# Patient Record
Sex: FEMALE | Race: WHITE | HISPANIC OR LATINO | Employment: PART TIME | URBAN - METROPOLITAN AREA
[De-identification: names, ages, dates, MRNs, and addresses within clinical notes are randomized per-mention and may not be internally consistent; named-entity substitution may affect disease eponyms.]

---

## 2017-02-08 ENCOUNTER — RECORDS - HEALTHEAST (OUTPATIENT)
Dept: LAB | Facility: CLINIC | Age: 42
End: 2017-02-08

## 2017-02-08 LAB
CHOLEST SERPL-MCNC: 147 MG/DL
FASTING STATUS PATIENT QL REPORTED: NORMAL
HDLC SERPL-MCNC: 56 MG/DL
LDLC SERPL CALC-MCNC: 82 MG/DL
TRIGL SERPL-MCNC: 46 MG/DL

## 2017-02-09 LAB — HBA1C MFR BLD: 6.7 % (ref 4.2–6.1)

## 2017-10-02 ENCOUNTER — RECORDS - HEALTHEAST (OUTPATIENT)
Dept: LAB | Facility: CLINIC | Age: 42
End: 2017-10-02

## 2017-10-03 LAB — HBA1C MFR BLD: 6.6 % (ref 4.2–6.1)

## 2017-12-24 ENCOUNTER — HEALTH MAINTENANCE LETTER (OUTPATIENT)
Age: 42
End: 2017-12-24

## 2018-08-20 ENCOUNTER — HOSPITAL ENCOUNTER (EMERGENCY)
Facility: CLINIC | Age: 43
Discharge: HOME | End: 2018-08-20
Attending: EMERGENCY MEDICINE | Admitting: EMERGENCY MEDICINE

## 2018-08-20 VITALS
HEART RATE: 82 BPM | SYSTOLIC BLOOD PRESSURE: 131 MMHG | HEIGHT: 62 IN | BODY MASS INDEX: 26.68 KG/M2 | OXYGEN SATURATION: 98 % | RESPIRATION RATE: 16 BRPM | DIASTOLIC BLOOD PRESSURE: 72 MMHG | WEIGHT: 145 LBS | TEMPERATURE: 98.1 F

## 2018-08-20 DIAGNOSIS — E16.2 HYPOGLYCEMIA: ICD-10-CM

## 2018-08-20 LAB
ANION GAP SERPL CALCULATED.3IONS-SCNC: 4 MMOL/L (ref 3–14)
BUN SERPL-MCNC: 15 MG/DL (ref 7–30)
CALCIUM SERPL-MCNC: 8.8 MG/DL (ref 8.5–10.1)
CHLORIDE SERPL-SCNC: 110 MMOL/L (ref 94–109)
CO2 SERPL-SCNC: 29 MMOL/L (ref 20–32)
CREAT SERPL-MCNC: 0.59 MG/DL (ref 0.52–1.04)
GFR SERPL CREATININE-BSD FRML MDRD: >90 ML/MIN/1.7M2
GLUCOSE BLDC GLUCOMTR-MCNC: 117 MG/DL (ref 70–99)
GLUCOSE BLDC GLUCOMTR-MCNC: 209 MG/DL (ref 70–99)
GLUCOSE SERPL-MCNC: 38 MG/DL (ref 70–99)
POTASSIUM SERPL-SCNC: 3.4 MMOL/L (ref 3.4–5.3)
SODIUM SERPL-SCNC: 143 MMOL/L (ref 133–144)

## 2018-08-20 PROCEDURE — 00000146 ZZHCL STATISTIC GLUCOSE BY METER IP

## 2018-08-20 PROCEDURE — 99284 EMERGENCY DEPT VISIT MOD MDM: CPT | Mod: 25

## 2018-08-20 PROCEDURE — 80048 BASIC METABOLIC PNL TOTAL CA: CPT | Performed by: EMERGENCY MEDICINE

## 2018-08-20 PROCEDURE — 96374 THER/PROPH/DIAG INJ IV PUSH: CPT

## 2018-08-20 PROCEDURE — 25800025 ZZH RX 258: Performed by: EMERGENCY MEDICINE

## 2018-08-20 RX ORDER — DEXTROSE MONOHYDRATE 25 G/50ML
25 INJECTION, SOLUTION INTRAVENOUS ONCE
Status: COMPLETED | OUTPATIENT
Start: 2018-08-20 | End: 2018-08-20

## 2018-08-20 RX ADMIN — DEXTROSE MONOHYDRATE 25 ML: 25 INJECTION, SOLUTION INTRAVENOUS at 14:54

## 2018-08-20 ASSESSMENT — ENCOUNTER SYMPTOMS
CONFUSION: 1
FEVER: 0
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
## 2018-08-20 NOTE — ED AVS SNAPSHOT
Emergency Department    [address] [facility] [address]    Phone:  [phone]    Fax:  [phone]                                       Keyla Piña   MRN: [id number]    Department:   Emergency Department   Date of Visit:  8/20/2018           Patient Information     Date Of Birth          1975        Your diagnoses for this visit were:     Hypoglycemia        You were seen by Dorene Mccray MD.      Follow-up Information     Follow up with Clinic, [facility].    Contact information:    [address] [facility] [address] [phone]          Follow up with  Emergency Department.    Specialty:  EMERGENCY MEDICINE    Why:  If symptoms worsen    Contact information:    [address] [facility] [address] [phone]        Discharge Instructions         Hypoglycemia (Low Blood Sugar)     Fast-acting sugar includes a cup of nonfat milk.   Too little sugar (glucose) in your blood is called hypoglycemia or low blood sugar. Low blood sugar usually means anything lower than 70 mg/dL. Talk with your healthcare provider about your target range and what level is too low for you. Diabetes itself doesn t cause low blood sugar. But some of the treatments for diabetes, such as pills or insulin, may raise your risk for it. Low blood sugar may cause you to pass out or have a seizure. So always treat low blood sugar right away, but don't overeat.  Special note: Always carry a source of fast-acting sugar and a snack in case of hypoglycemia.   What you may notice  If you have low blood sugar, you may have one or more of these symptoms:    Shakiness or dizziness    Cold, clammy skin or sweating    Feelings of hunger    Headache    Nervousness    A hard, fast heartbeat    Weakness    Confusion or irritability    Blurred vision    Having nightmares or waking up confused or sweating    Numbness or tingling in the lips or tongue  What you should do  Here are tips to  follow if you have hypoglycemia:     First check your blood sugar. If it is too low (out of your target range), eat or drink 15 to 20 grams of fast-acting sugar. This may be 3 to 4 glucose tablets, 4 ounces (half a cup) of fruit juice or regular (nondiet) soda, 8 ounces (1 cup) of fat-free milk, or 1 tablespoon of honey. Don t take more than this, or your blood sugar may go too high.    Wait 15 minutes. Then recheck your blood sugar if you can.    If your blood sugar is still too low, repeat the steps above and check your blood sugar again. If your blood sugar still has not returned to your target range, contact your healthcare provider or seek emergency care.    Once your blood sugar returns to target range, eat a snack or meal.  Preventing low blood sugar  Things you can do include the following:     If your condition needs a strict treatment plan, eat your meals and snacks at the same times each day. Don t skip meals!    If your treatment plan lets you change when you eat and what you eat, learn how to change the time and dose of your rapid-acting insulin to match this.     Ask your healthcare provider if it is safe for you to drink alcohol. Never drink on an empty stomach.    Take your medicine at the prescribed times.    Always carry a source of fast-acting sugar and a snack when you re away from home.  Other things to do  Additional tips include the following:    Carry a medical ID card, a compact USB drive, or wear a medical alert bracelet or necklace. It should say that you have diabetes. It should also say what to do if you pass out or have a seizure.    Make sure your family, friends, and coworkers know the signs of low blood sugar. Tell them what to do if your blood sugar falls very low and you can t treat yourself.    Keep a glucagon emergency kit handy. Be sure your family, friends, and coworkers know how and when to use it. Check it regularly and replace the glucagon before it expires.    Talk with  your health care team about other things you can do to prevent low blood sugar.     If you have unexplained hypoglycemia or hypoglycemia several times, call your healthcare provider.   Date Last Reviewed: 5/1/2016 2000-2017 [employer]. [address] [place] [address], [place] [address]. All rights reserved. This information is not intended as a substitute for professional medical care. Always follow your healthcare professional's instructions.          24 Hour Appointment Hotline       To make an appointment at any [facility], call [phone] ([phone]). If you don't have a family doctor or clinic, we will help you find one. [facility] clinics are conveniently located to serve the needs of you and your family.             Review of your medicines      Our records show that you are taking the medicines listed below. If these are incorrect, please call your family doctor or clinic.        Dose / Directions Last dose taken    * insulin aspart 100 UNIT/ML injection   Commonly known as:  NovoLOG PEN   Quantity:  1 Month        Pre-meal blood glucose (BG)  - 164 give 1 unit.  - 189 give 2 units.  - 214 give 3 units.  - 239 give 4 units.  - 264 give 5 units.  - 289 give 6 units.  - 314 give 7 units.  - 339 give 8 units.  - 364 give 9 units. BG over 364 give 10 units.   Refills:  0        * insulin aspart 100 UNITS/ML injection   Commonly known as:  NovoLOG VIAL   Quantity:  3 Month        5 units before breakfast, 5 units before lunch, 5 units before dinner PLUS Sliding scale   Refills:  3        insulin glargine 100 UNIT/ML injection   Commonly known as:  LANTUS SOLOSTAR   Quantity:  1 Month        25 units at bedtime   Refills:  1        * Notice:  This list has 2 medication(s) that are the same as other medications prescribed for you. Read the directions carefully, and ask your doctor or other care provider to review them with you.             Procedures and tests performed during your visit     Procedure/Test Number of Times Performed    Basic metabolic panel 1    Glucose by meter 2    Glucose monitor nursing POCT 2      Orders Needing Specimen Collection     None      Pending Results     No orders found from 8/18/2018 to 8/21/2018.            Pending Culture Results     No orders found from 8/18/2018 to 8/21/2018.            Pending Results Instructions     If you had any lab results that were not finalized at the time of your Discharge, you can call the ED Lab Result RN at [phone]. You will be contacted by this team for any positive Lab results or changes in treatment. The nurses are available 7 days a week from 10A to 6:30P.  You can leave a message 24 hours per day and they will return your call.        Test Results From Your Hospital Stay        8/20/2018  2:46 PM      Component Results     Component Value Ref Range & Units Status    Sodium 143 133 - 144 mmol/L Final    Potassium 3.4 3.4 - 5.3 mmol/L Final    Chloride 110 (H) 94 - 109 mmol/L Final    Carbon Dioxide 29 20 - 32 mmol/L Final    Anion Gap 4 3 - 14 mmol/L Final    Glucose 38 (LL) 70 - 99 mg/dL Final    Critical Value called to and read back by  ANA GODINEZ IN ER AT 1442 SM      Urea Nitrogen 15 7 - 30 mg/dL Final    Creatinine 0.59 0.52 - 1.04 mg/dL Final    GFR Estimate >90 >60 mL/min/1.7m2 Final    Non  GFR Calc    GFR Estimate If Black >90 >60 mL/min/1.7m2 Final    African American GFR Calc    Calcium 8.8 8.5 - 10.1 mg/dL Final         8/20/2018  4:22 PM      Component Results     Component Value Ref Range & Units Status    Glucose 117 (H) 70 - 99 mg/dL Final         8/20/2018  5:34 PM      Component Results     Component Value Ref Range & Units Status    Glucose 209 (H) 70 - 99 mg/dL Final                Clinical Quality Measure: Blood Pressure Screening     Your blood pressure was checked while you were in the emergency department today. The last reading we  "obtained was  BP: 131/72 . Please read the guidelines below about what these numbers mean and what you should do about them.  If your systolic blood pressure (the top number) is less than 120 and your diastolic blood pressure (the bottom number) is less than 80, then your blood pressure is normal. There is nothing more that you need to do about it.  If your systolic blood pressure (the top number) is 120-139 or your diastolic blood pressure (the bottom number) is 80-89, your blood pressure may be higher than it should be. You should have your blood pressure rechecked within a year by a primary care provider.  If your systolic blood pressure (the top number) is 140 or greater or your diastolic blood pressure (the bottom number) is 90 or greater, you may have high blood pressure. High blood pressure is treatable, but if left untreated over time it can put you at risk for heart attack, stroke, or kidney failure. You should have your blood pressure rechecked by a primary care provider within the next 4 weeks.  If your provider in the emergency department today gave you specific instructions to follow-up with your doctor or provider even sooner than that, you should follow that instruction and not wait for up to 4 weeks for your follow-up visit.        Thank you for choosing [facility]       Thank you for choosing [facility] for your care. Our goal is always to provide you with excellent care. Hearing back from our patients is one way we can continue to improve our services. Please take a few minutes to complete the written survey that you may receive in the mail after you visit with us. Thank you!        [employer] Information     [employer] lets you send messages to your doctor, view your test results, renew your prescriptions, schedule appointments and more. To sign up, go to [email or web address] . Click on \"Log in\" on the left side of the screen, which will take you to the Welcome page. Then click on \"Sign up Now\" on the " right side of the page.     You will be asked to enter the access code listed below, as well as some personal information. Please follow the directions to create your username and password.     Your access code is: [email or web address]  Expires: 2018  6:02 PM     Your access code will  in 90 days. If you need help or a new code, please call your [facility] clinic or [phone].        Care EveryWhere ID     This is your Care EveryWhere ID. This could be used by other organizations to access your [facility] medical records  [id number]        Equal Access to Services     [facility] : Fidelia Salamanca, pedro govea, tabitha galindo, vanessa fajardo . So [facility] [phone].    ATENCIÓN: Si habla español, tiene a charles disposición servicios gratuitos de asistencia lingüística. Llame al [phone].    We comply with applicable federal civil rights laws and [place] laws. We do not discriminate on the basis of race, color, national origin, age, disability, sex, sexual orientation, or gender identity.            After Visit Summary       This is your record. Keep this with you and show to your community pharmacist(s) and doctor(s) at your next visit.

## 2018-08-20 NOTE — DISCHARGE INSTRUCTIONS
Hypoglycemia (Low Blood Sugar)     Fast-acting sugar includes a cup of nonfat milk.   Too little sugar (glucose) in your blood is called hypoglycemia or low blood sugar. Low blood sugar usually means anything lower than 70 mg/dL. Talk with your healthcare provider about your target range and what level is too low for you. Diabetes itself doesn t cause low blood sugar. But some of the treatments for diabetes, such as pills or insulin, may raise your risk for it. Low blood sugar may cause you to pass out or have a seizure. So always treat low blood sugar right away, but don't overeat.  Special note: Always carry a source of fast-acting sugar and a snack in case of hypoglycemia.   What you may notice  If you have low blood sugar, you may have one or more of these symptoms:    Shakiness or dizziness    Cold, clammy skin or sweating    Feelings of hunger    Headache    Nervousness    A hard, fast heartbeat    Weakness    Confusion or irritability    Blurred vision    Having nightmares or waking up confused or sweating    Numbness or tingling in the lips or tongue  What you should do  Here are tips to follow if you have hypoglycemia:     First check your blood sugar. If it is too low (out of your target range), eat or drink 15 to 20 grams of fast-acting sugar. This may be 3 to 4 glucose tablets, 4 ounces (half a cup) of fruit juice or regular (nondiet) soda, 8 ounces (1 cup) of fat-free milk, or 1 tablespoon of honey. Don t take more than this, or your blood sugar may go too high.    Wait 15 minutes. Then recheck your blood sugar if you can.    If your blood sugar is still too low, repeat the steps above and check your blood sugar again. If your blood sugar still has not returned to your target range, contact your healthcare provider or seek emergency care.    Once your blood sugar returns to target range, eat a snack or meal.  Preventing low blood sugar  Things you can do include the following:     If your condition  needs a strict treatment plan, eat your meals and snacks at the same times each day. Don t skip meals!    If your treatment plan lets you change when you eat and what you eat, learn how to change the time and dose of your rapid-acting insulin to match this.     Ask your healthcare provider if it is safe for you to drink alcohol. Never drink on an empty stomach.    Take your medicine at the prescribed times.    Always carry a source of fast-acting sugar and a snack when you re away from home.  Other things to do  Additional tips include the following:    Carry a medical ID card, a compact USB drive, or wear a medical alert bracelet or necklace. It should say that you have diabetes. It should also say what to do if you pass out or have a seizure.    Make sure your family, friends, and coworkers know the signs of low blood sugar. Tell them what to do if your blood sugar falls very low and you can t treat yourself.    Keep a glucagon emergency kit handy. Be sure your family, friends, and coworkers know how and when to use it. Check it regularly and replace the glucagon before it expires.    Talk with your health care team about other things you can do to prevent low blood sugar.     If you have unexplained hypoglycemia or hypoglycemia several times, call your healthcare provider.   Date Last Reviewed: 5/1/2016 2000-2017 [employer]. [address] [place] [address], [place] [address]. All rights reserved. This information is not intended as a substitute for professional medical care. Always follow your healthcare professional's instructions.

## 2018-08-20 NOTE — ED AVS SNAPSHOT
Emergency Department    [address] [place] [address]    Phone:  [phone]    Fax:  [phone]                                       Keyla Piña   MRN: [id number]    Department:   Emergency Department   Date of Visit:  8/20/2018           After Visit Summary Signature Page     I have received my discharge instructions, and my questions have been answered. I have discussed any challenges I see with this plan with the nurse or doctor.    ..........................................................................................................................................  Patient/Patient Representative Signature      ..........................................................................................................................................  Patient Representative Print Name and Relationship to Patient    ..................................................               ................................................  Date                                            Time    ..........................................................................................................................................  Reviewed by Signature/Title    ...................................................              ..............................................  Date                                                            Time

## 2018-08-20 NOTE — ED NOTES
DATE:  8/20/2018   TIME OF RECEIPT FROM LAB:  1442  LAB TEST:  glucose  LAB VALUE:  38  RESULTS GIVEN WITH READ-BACK TO (PROVIDER): [place]  TIME LAB VALUE REPORTED TO PROVIDER:   1447

## 2018-08-20 NOTE — ED NOTES
Bed: ED25  Expected date:   Expected time:   Means of arrival:   Comments:  Xi - 43F diabetic eta 1400
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
## 2018-08-22 ENCOUNTER — RECORDS - HEALTHEAST (OUTPATIENT)
Dept: LAB | Facility: CLINIC | Age: 43
End: 2018-08-22

## 2018-08-23 LAB — HBA1C MFR BLD: 5.4 % (ref 4.2–6.1)

## 2019-12-02 ENCOUNTER — HOSPITAL ENCOUNTER (OUTPATIENT)
Dept: LAB | Age: 44
Setting detail: SPECIMEN
Discharge: HOME | End: 2019-12-02

## 2019-12-04 LAB
HPV SOURCE: NORMAL
HUMAN PAPILLOMA VIRUS 16 DNA: NEGATIVE
HUMAN PAPILLOMA VIRUS 18 DNA: NEGATIVE
HUMAN PAPILLOMA VIRUS FINAL DIAGNOSIS: NORMAL
HUMAN PAPILLOMA VIRUS OTHER HR: NEGATIVE
SPECIMEN DESCRIPTION: NORMAL

## 2019-12-09 ENCOUNTER — RECORDS - HEALTHEAST (OUTPATIENT)
Dept: ADMINISTRATIVE | Facility: OTHER | Age: 44
End: 2019-12-09

## 2019-12-09 LAB
BKR LAB AP ABNORMAL BLEEDING: NO
BKR LAB AP BIRTH CONTROL/HORMONES: NORMAL
BKR LAB AP CERVICAL APPEARANCE: NORMAL
BKR LAB AP GYN ADEQUACY: NORMAL
BKR LAB AP GYN INTERPRETATION: NORMAL
BKR LAB AP GYN OTHER FINDINGS: NORMAL
BKR LAB AP HPV REFLEX: NORMAL
BKR LAB AP LMP: NORMAL
BKR LAB AP PATIENT STATUS: NORMAL
BKR LAB AP PREVIOUS ABNORMAL: NORMAL
BKR LAB AP PREVIOUS NORMAL: 2016
HIGH RISK?: NORMAL
PATH REPORT.COMMENTS IMP SPEC: NORMAL
RESULT FLAG (HE HISTORICAL CONVERSION): NORMAL

## 2021-09-08 ENCOUNTER — LAB REQUISITION (OUTPATIENT)
Dept: LAB | Facility: CLINIC | Age: 46
End: 2021-09-08

## 2021-09-08 LAB
CREAT UR-MCNC: 133 MG/DL
MICROALBUMIN UR-MCNC: 1.35 MG/DL (ref 0–1.99)
MICROALBUMIN/CREAT UR: 10.2 MG/G CR

## 2021-09-08 PROCEDURE — 82043 UR ALBUMIN QUANTITATIVE: CPT | Performed by: FAMILY MEDICINE

## 2022-10-26 ENCOUNTER — LAB REQUISITION (OUTPATIENT)
Dept: LAB | Facility: CLINIC | Age: 47
End: 2022-10-26

## 2022-10-26 LAB
ALBUMIN SERPL BCG-MCNC: 4.2 G/DL (ref 3.5–5.2)
ALP SERPL-CCNC: 76 U/L (ref 35–104)
ALT SERPL W P-5'-P-CCNC: 18 U/L (ref 10–35)
AST SERPL W P-5'-P-CCNC: 17 U/L (ref 10–35)
BASOPHILS # BLD AUTO: 0 10E3/UL (ref 0–0.2)
BASOPHILS NFR BLD AUTO: 0 %
BILIRUB DIRECT SERPL-MCNC: <0.2 MG/DL (ref 0–0.3)
BILIRUB SERPL-MCNC: 0.5 MG/DL
EOSINOPHIL # BLD AUTO: 0.1 10E3/UL (ref 0–0.7)
EOSINOPHIL NFR BLD AUTO: 1 %
ERYTHROCYTE [DISTWIDTH] IN BLOOD BY AUTOMATED COUNT: 13.8 % (ref 10–15)
HCT VFR BLD AUTO: 39.7 % (ref 35–47)
HGB BLD-MCNC: 13 G/DL (ref 11.7–15.7)
IMM GRANULOCYTES # BLD: 0 10E3/UL
IMM GRANULOCYTES NFR BLD: 0 %
LIPASE SERPL-CCNC: 25 U/L (ref 13–60)
LYMPHOCYTES # BLD AUTO: 3.1 10E3/UL (ref 0.8–5.3)
LYMPHOCYTES NFR BLD AUTO: 34 %
MCH RBC QN AUTO: 30.6 PG (ref 26.5–33)
MCHC RBC AUTO-ENTMCNC: 32.7 G/DL (ref 31.5–36.5)
MCV RBC AUTO: 93 FL (ref 78–100)
MONOCYTES # BLD AUTO: 0.6 10E3/UL (ref 0–1.3)
MONOCYTES NFR BLD AUTO: 7 %
NEUTROPHILS # BLD AUTO: 5.3 10E3/UL (ref 1.6–8.3)
NEUTROPHILS NFR BLD AUTO: 58 %
NRBC # BLD AUTO: 0 10E3/UL
NRBC BLD AUTO-RTO: 0 /100
PLATELET # BLD AUTO: 208 10E3/UL (ref 150–450)
PROT SERPL-MCNC: 7.1 G/DL (ref 6.4–8.3)
RBC # BLD AUTO: 4.25 10E6/UL (ref 3.8–5.2)
WBC # BLD AUTO: 9.1 10E3/UL (ref 4–11)

## 2022-10-26 PROCEDURE — 80076 HEPATIC FUNCTION PANEL: CPT | Performed by: FAMILY MEDICINE

## 2022-10-26 PROCEDURE — 83690 ASSAY OF LIPASE: CPT | Performed by: FAMILY MEDICINE

## 2022-10-26 PROCEDURE — 85025 COMPLETE CBC W/AUTO DIFF WBC: CPT | Performed by: FAMILY MEDICINE

## 2022-10-27 ENCOUNTER — APPOINTMENT (OUTPATIENT)
Dept: ULTRASOUND IMAGING | Facility: CLINIC | Age: 47
End: 2022-10-27
Attending: EMERGENCY MEDICINE

## 2022-10-27 ENCOUNTER — HOSPITAL ENCOUNTER (EMERGENCY)
Facility: CLINIC | Age: 47
Discharge: HOME | End: 2022-10-27
Attending: EMERGENCY MEDICINE | Admitting: EMERGENCY MEDICINE

## 2022-10-27 VITALS
OXYGEN SATURATION: 100 % | RESPIRATION RATE: 14 BRPM | SYSTOLIC BLOOD PRESSURE: 125 MMHG | HEART RATE: 70 BPM | DIASTOLIC BLOOD PRESSURE: 76 MMHG | TEMPERATURE: 97.4 F

## 2022-10-27 DIAGNOSIS — K80.50 BILIARY COLIC: ICD-10-CM

## 2022-10-27 LAB
ALBUMIN SERPL BCG-MCNC: 3.8 G/DL (ref 3.5–5.2)
ALP SERPL-CCNC: 77 U/L (ref 35–104)
ALT SERPL W P-5'-P-CCNC: 13 U/L (ref 10–35)
ANION GAP SERPL CALCULATED.3IONS-SCNC: 11 MMOL/L (ref 7–15)
AST SERPL W P-5'-P-CCNC: 19 U/L (ref 10–35)
BASOPHILS # BLD AUTO: 0 10E3/UL (ref 0–0.2)
BASOPHILS NFR BLD AUTO: 0 %
BILIRUB SERPL-MCNC: 0.6 MG/DL
BUN SERPL-MCNC: 23.2 MG/DL (ref 6–20)
CALCIUM SERPL-MCNC: 9.4 MG/DL (ref 8.6–10)
CHLORIDE SERPL-SCNC: 102 MMOL/L (ref 98–107)
CREAT SERPL-MCNC: 0.36 MG/DL (ref 0.51–0.95)
DEPRECATED HCO3 PLAS-SCNC: 25 MMOL/L (ref 22–29)
EOSINOPHIL # BLD AUTO: 0.2 10E3/UL (ref 0–0.7)
EOSINOPHIL NFR BLD AUTO: 2 %
ERYTHROCYTE [DISTWIDTH] IN BLOOD BY AUTOMATED COUNT: 13.5 % (ref 10–15)
GFR SERPL CREATININE-BSD FRML MDRD: >90 ML/MIN/1.73M2
GLUCOSE SERPL-MCNC: 112 MG/DL (ref 70–99)
HCG SERPL QL: NEGATIVE
HCT VFR BLD AUTO: 39.7 % (ref 35–47)
HGB BLD-MCNC: 12.6 G/DL (ref 11.7–15.7)
IMM GRANULOCYTES # BLD: 0 10E3/UL
IMM GRANULOCYTES NFR BLD: 0 %
LIPASE SERPL-CCNC: 25 U/L (ref 13–60)
LYMPHOCYTES # BLD AUTO: 2.6 10E3/UL (ref 0.8–5.3)
LYMPHOCYTES NFR BLD AUTO: 32 %
MCH RBC QN AUTO: 30.4 PG (ref 26.5–33)
MCHC RBC AUTO-ENTMCNC: 31.7 G/DL (ref 31.5–36.5)
MCV RBC AUTO: 96 FL (ref 78–100)
MONOCYTES # BLD AUTO: 0.7 10E3/UL (ref 0–1.3)
MONOCYTES NFR BLD AUTO: 9 %
NEUTROPHILS # BLD AUTO: 4.7 10E3/UL (ref 1.6–8.3)
NEUTROPHILS NFR BLD AUTO: 57 %
NRBC # BLD AUTO: 0 10E3/UL
NRBC BLD AUTO-RTO: 0 /100
PLATELET # BLD AUTO: 185 10E3/UL (ref 150–450)
POTASSIUM SERPL-SCNC: 4.1 MMOL/L (ref 3.4–5.3)
PROT SERPL-MCNC: 7.1 G/DL (ref 6.4–8.3)
RBC # BLD AUTO: 4.14 10E6/UL (ref 3.8–5.2)
SODIUM SERPL-SCNC: 138 MMOL/L (ref 136–145)
WBC # BLD AUTO: 8.3 10E3/UL (ref 4–11)

## 2022-10-27 PROCEDURE — 76705 ECHO EXAM OF ABDOMEN: CPT

## 2022-10-27 PROCEDURE — 83690 ASSAY OF LIPASE: CPT | Performed by: EMERGENCY MEDICINE

## 2022-10-27 PROCEDURE — 85025 COMPLETE CBC W/AUTO DIFF WBC: CPT | Performed by: EMERGENCY MEDICINE

## 2022-10-27 PROCEDURE — 99284 EMERGENCY DEPT VISIT MOD MDM: CPT | Mod: 25

## 2022-10-27 PROCEDURE — 84703 CHORIONIC GONADOTROPIN ASSAY: CPT | Performed by: EMERGENCY MEDICINE

## 2022-10-27 PROCEDURE — 80053 COMPREHEN METABOLIC PANEL: CPT | Performed by: EMERGENCY MEDICINE

## 2022-10-27 PROCEDURE — 36415 COLL VENOUS BLD VENIPUNCTURE: CPT | Performed by: EMERGENCY MEDICINE

## 2022-10-27 ASSESSMENT — ENCOUNTER SYMPTOMS
FEVER: 0
NAUSEA: 0
COUGH: 0
SHORTNESS OF BREATH: 0
DIARRHEA: 0
VOMITING: 0
DYSURIA: 0
CHEST TIGHTNESS: 0
PALPITATIONS: 0
CHILLS: 0
FLANK PAIN: 0
ABDOMINAL PAIN: 1

## 2022-10-27 ASSESSMENT — ACTIVITIES OF DAILY LIVING (ADL)
ADLS_ACUITY_SCORE: 33
ADLS_ACUITY_SCORE: 35

## 2022-10-27 NOTE — ED TRIAGE NOTES
Pt arrives with RUQ pain for about 2 weeks or so. Pt reports she hasn't eaten anything today because she started vomiting on Tuesday afternoon.      Triage Assessment     Row Name 10/27/22 1418       Triage Assessment (Adult)    Airway WDL WDL       Respiratory WDL    Respiratory WDL WDL       Skin Circulation/Temperature WDL    Skin Circulation/Temperature WDL WDL       Cardiac WDL    Cardiac WDL WDL       Peripheral/Neurovascular WDL    Peripheral Neurovascular WDL WDL       Cognitive/Neuro/Behavioral WDL    Cognitive/Neuro/Behavioral WDL WDL

## 2022-10-27 NOTE — ED PROVIDER NOTES
Rapid Assessment Note    History:   Keyla Piña is a 47 year old female who presents with nausea vomiting and right upper quadrant pain on and off for 1 to 2 weeks.  Was worse yesterday seems to have settled a bit today.  No prior abdominal surgeries, no fevers.  She has been drinking fluids in the last 24 hours without any vomiting.    Exam:     Gen: Resting comfortably   Eyes: Clear conjunctiva, no discharge  Ears: External ears normal without swelling or drainage  Mouth: Mucous membranes moist  CV: regular rate  Resp: speaking in full sentences without any resp distress  Skin: warm dry well perfused  Neuro: Alert, no gross motor or sensory deficits,   Psych: pleasant, affect appropriate        Plan of Care:   I evaluated the patient and developed an initial plan of care. I discussed this plan and explained that I, or one of my partners, would be returning to complete the evaluation.       10/27/2022  [employer]    Portions of this medical record were completed by a scribe. UPON MY REVIEW AND AUTHENTICATION BY ELECTRONIC SIGNATURE, this confirms (a) I performed the applicable clinical services, and (b) the record is accurate.        Moises Macias MD  10/27/22 3622

## 2022-10-27 NOTE — ED PROVIDER NOTES
History     Chief Complaint:    Abdominal Pain      HPI   Keyla Piña is a 47 year old female who presents with RUQ pain intermittently for 20+ days.  No changes specifically today just wanted checked out.  Has intermittent heartburn with these episodes as well vomiting.  These are worse with eating spicy or fatty foods and has been pain free and vomit free for last 24 hours.      Review of Systems   Constitutional: Negative for chills and fever.   Respiratory: Negative for cough, chest tightness and shortness of breath.    Cardiovascular: Negative for chest pain, palpitations and leg swelling.   Gastrointestinal: Positive for abdominal pain. Negative for diarrhea, nausea and vomiting.   Genitourinary: Negative for dysuria and flank pain.   All other systems reviewed and are negative.        Allergies:      Asa [Aspirin]  Ibuprofen      Medications:      insulin aspart (NOVOLOG PEN) 100 UNIT/ML soln  insulin aspart (NOVOLOG VIAL) 100 UNITS/ML soln  insulin glargine (LANTUS SOLOSTAR) 100 UNIT/ML PEN        Past Medical History:        Past Medical History:   Diagnosis Date     GDM (gestational diabetes mellitus)      Patient Active Problem List    Diagnosis Date Noted     Uncontrolled type I diabetes mellitus 10/23/2015     Priority: Medium     Viral syndrome 10/12/2014     Priority: Medium     DKA (diabetic ketoacidoses) 10/12/2014     Priority: Medium     Replacing diagnoses that were inactivated after the 10/1/2021 regulatory import.       Acidosis (LACTIC) 10/12/2014     Priority: Medium        Past Surgical History:        Past Surgical History:   Procedure Laterality Date     NO HISTORY OF SURGERY         Family History:        Family History   Problem Relation Age of Onset     Diabetes Mother      Diabetes Father        Social History:    Clinic, [facility]  The patient presents to the ED with family    Physical Exam     Patient Vitals for the past 24 hrs:   BP Temp Temp src Pulse Resp SpO2    10/27/22 1417 121/64 97.4  F (36.3  C) Temporal 77 18 100 %       Physical Exam  Constitutional: Patient is well appearing. No distress.  Head: Atraumatic.  Eyes: Conjunctivae and EOM are normal. No scleral icterus.  Neck: Normal range of motion. Neck supple.   Cardiovascular: Normal rate, regular rhythm, normal heart sounds and intact distal perfusion.   Pulmonary/Chest: Breath sounds normal. No respiratory distress.  Abdominal: Soft. Bowel sounds are normal. No distension. No tenderness. No rebound or guarding.   Musculoskeletal: Normal range of motion. No edema or tenderness.   Neurological: Alert and orientated to person, place, and time. No observable focal neuro deficit  Skin: Warm and dry. No rash noted. Not diaphoretic.     Emergency Department Course       ECG results from 10/12/14   EKG 12-lead, tracing only     Value    Interpretation ECG Click View Image link to view waveform and result       Imaging:  Abdomen US, limited (RUQ only)   Preliminary Result   IMPRESSION:   1.  Potential tiny cholelithiasis versus artifact. Negative   sonographic Anne's sign. No biliary ductal dilatation.        Report per radiology    Laboratory:  Labs Ordered and Resulted from Time of ED Arrival to Time of ED Departure   COMPREHENSIVE METABOLIC PANEL - Abnormal       Result Value    Sodium 138      Potassium 4.1      Chloride 102      Carbon Dioxide (CO2) 25      Anion Gap 11      Urea Nitrogen 23.2 (*)     Creatinine 0.36 (*)     Calcium 9.4      Glucose 112 (*)     Alkaline Phosphatase 77      AST 19      ALT 13      Protein Total 7.1      Albumin 3.8      Bilirubin Total 0.6      GFR Estimate >90     LIPASE - Normal    Lipase 25     HCG QUALITATIVE PREGNANCY - Normal    hCG Serum Qualitative Negative     CBC WITH PLATELETS AND DIFFERENTIAL    WBC Count 8.3      RBC Count 4.14      Hemoglobin 12.6      Hematocrit 39.7      MCV 96      MCH 30.4      MCHC 31.7      RDW 13.5      Platelet Count 185      % Neutrophils 57       % Lymphocytes 32      % Monocytes 9      % Eosinophils 2      % Basophils 0      % Immature Granulocytes 0      NRBCs per 100 WBC 0      Absolute Neutrophils 4.7      Absolute Lymphocytes 2.6      Absolute Monocytes 0.7      Absolute Eosinophils 0.2      Absolute Basophils 0.0      Absolute Immature Granulocytes 0.0      Absolute NRBCs 0.0         Procedures:      Emergency Department Course:             Reviewed:    I reviewed nursing notes, vitals and past medical history    Assessments:   I obtained history and examined the patient as noted above.    I rechecked the patient and explained findings.       Consults:            Interventions:    Medications - No data to display    Disposition:  The patient was discharged to home.     Impression & Plan        Medical Decision Makin who presents for evaluation of abdominal pain in the RUQ quadrant.  This patient has symptoms consistent with gallstones and biliary colic.  Ultrasound has confirmed the presence of gallstones.  There is no clinical, laboratory, or ultrasound evidence of choledocholithiasis, gallstone pancreatitis, or ascending cholangitis.  I doubt perforated ulcer, diverticulitis, colitis.  Certainly also may have component of GERD or gastritis.  The patient will be prescribed analgesics. The patient was educated to avoid fatty foods.  The patient was told to return to ED for increasing pain, vomiting, chills, sweats, or fever.  Follow up with general surgery is indicated for outpatient consultation, this may be arranged as soon as able.  See primary care doctor this week for recheck.      Covid-19  Keyla Piña was evaluated during a global COVID-19 pandemic, which necessitated consideration that the patient might be at risk for infection with the SARS-CoV-2 virus that causes COVID-19.   Applicable protocols for evaluation were followed during the patient's care.   COVID-19 was considered as part of the patient's  evaluation.    Diagnosis:    ICD-10-CM    1. Biliary colic  K80.50           Discharge Medications:  New Prescriptions    No medications on file         Scribe Disclosure:  I, Eduardo Dee MD, am serving as a scribe at 6:03 PM on 10/27/2022 to document services personally performed by Eduardo Dee MD based on my observations and the provider's statements to me.      Eduardo Dee MD  10/27/22 0477

## 2024-12-07 ENCOUNTER — HOSPITAL ENCOUNTER (OUTPATIENT)
Facility: CLINIC | Age: 49
Setting detail: OBSERVATION
Discharge: HOME | End: 2024-12-07
Attending: EMERGENCY MEDICINE | Admitting: INTERNAL MEDICINE

## 2024-12-07 VITALS
DIASTOLIC BLOOD PRESSURE: 69 MMHG | RESPIRATION RATE: 18 BRPM | SYSTOLIC BLOOD PRESSURE: 129 MMHG | HEART RATE: 77 BPM | TEMPERATURE: 97.8 F | OXYGEN SATURATION: 98 %

## 2024-12-07 DIAGNOSIS — E16.2 HYPOGLYCEMIA: ICD-10-CM

## 2024-12-07 LAB
ALBUMIN SERPL BCG-MCNC: 4 G/DL (ref 3.5–5.2)
ALBUMIN UR-MCNC: NEGATIVE MG/DL
ALP SERPL-CCNC: 103 U/L (ref 40–150)
ALT SERPL W P-5'-P-CCNC: 21 U/L (ref 0–50)
ANION GAP SERPL CALCULATED.3IONS-SCNC: 11 MMOL/L (ref 7–15)
ANION GAP SERPL CALCULATED.3IONS-SCNC: 15 MMOL/L (ref 7–15)
APPEARANCE UR: CLEAR
AST SERPL W P-5'-P-CCNC: 27 U/L (ref 0–45)
BACTERIA #/AREA URNS HPF: ABNORMAL /HPF
BASOPHILS # BLD AUTO: 0.1 10E3/UL (ref 0–0.2)
BASOPHILS NFR BLD AUTO: 0 %
BILIRUB SERPL-MCNC: <0.2 MG/DL
BILIRUB UR QL STRIP: NEGATIVE
BUN SERPL-MCNC: 15.3 MG/DL (ref 6–20)
BUN SERPL-MCNC: 18.2 MG/DL (ref 6–20)
CALCIUM SERPL-MCNC: 8.8 MG/DL (ref 8.8–10.4)
CALCIUM SERPL-MCNC: 9 MG/DL (ref 8.8–10.4)
CHLORIDE SERPL-SCNC: 104 MMOL/L (ref 98–107)
CHLORIDE SERPL-SCNC: 107 MMOL/L (ref 98–107)
COLOR UR AUTO: ABNORMAL
CREAT SERPL-MCNC: 0.62 MG/DL (ref 0.51–0.95)
CREAT SERPL-MCNC: 0.72 MG/DL (ref 0.51–0.95)
EGFRCR SERPLBLD CKD-EPI 2021: >90 ML/MIN/1.73M2
EGFRCR SERPLBLD CKD-EPI 2021: >90 ML/MIN/1.73M2
EOSINOPHIL # BLD AUTO: 0.2 10E3/UL (ref 0–0.7)
EOSINOPHIL NFR BLD AUTO: 1 %
ERYTHROCYTE [DISTWIDTH] IN BLOOD BY AUTOMATED COUNT: 13.7 % (ref 10–15)
ETHANOL SERPL-MCNC: <0.01 G/DL
FLUAV RNA SPEC QL NAA+PROBE: NEGATIVE
FLUBV RNA RESP QL NAA+PROBE: NEGATIVE
GLUCOSE BLDC GLUCOMTR-MCNC: 189 MG/DL (ref 70–99)
GLUCOSE BLDC GLUCOMTR-MCNC: 200 MG/DL (ref 70–99)
GLUCOSE BLDC GLUCOMTR-MCNC: 205 MG/DL (ref 70–99)
GLUCOSE BLDC GLUCOMTR-MCNC: 205 MG/DL (ref 70–99)
GLUCOSE BLDC GLUCOMTR-MCNC: 251 MG/DL (ref 70–99)
GLUCOSE BLDC GLUCOMTR-MCNC: 263 MG/DL (ref 70–99)
GLUCOSE BLDC GLUCOMTR-MCNC: 279 MG/DL (ref 70–99)
GLUCOSE BLDC GLUCOMTR-MCNC: 311 MG/DL (ref 70–99)
GLUCOSE BLDC GLUCOMTR-MCNC: 323 MG/DL (ref 70–99)
GLUCOSE SERPL-MCNC: 282 MG/DL (ref 70–99)
GLUCOSE SERPL-MCNC: 37 MG/DL (ref 70–99)
GLUCOSE UR STRIP-MCNC: >=1000 MG/DL
HCO3 SERPL-SCNC: 21 MMOL/L (ref 22–29)
HCO3 SERPL-SCNC: 22 MMOL/L (ref 22–29)
HCT VFR BLD AUTO: 41.1 % (ref 35–47)
HGB BLD-MCNC: 13.4 G/DL (ref 11.7–15.7)
HGB UR QL STRIP: NEGATIVE
HOLD SPECIMEN: NORMAL
HOLD SPECIMEN: NORMAL
HYALINE CASTS: 1 /LPF
IMM GRANULOCYTES # BLD: 0.1 10E3/UL
IMM GRANULOCYTES NFR BLD: 0 %
KETONES UR STRIP-MCNC: NEGATIVE MG/DL
LEUKOCYTE ESTERASE UR QL STRIP: NEGATIVE
LIPASE SERPL-CCNC: 39 U/L (ref 13–60)
LYMPHOCYTES # BLD AUTO: 3.8 10E3/UL (ref 0.8–5.3)
LYMPHOCYTES NFR BLD AUTO: 24 %
MAGNESIUM SERPL-MCNC: 1.5 MG/DL (ref 1.7–2.3)
MAGNESIUM SERPL-MCNC: 1.5 MG/DL (ref 1.7–2.3)
MCH RBC QN AUTO: 30 PG (ref 26.5–33)
MCHC RBC AUTO-ENTMCNC: 32.6 G/DL (ref 31.5–36.5)
MCV RBC AUTO: 92 FL (ref 78–100)
MONOCYTES # BLD AUTO: 1.4 10E3/UL (ref 0–1.3)
MONOCYTES NFR BLD AUTO: 9 %
MUCOUS THREADS #/AREA URNS LPF: PRESENT /LPF
NEUTROPHILS # BLD AUTO: 10.5 10E3/UL (ref 1.6–8.3)
NEUTROPHILS NFR BLD AUTO: 66 %
NITRATE UR QL: POSITIVE
NRBC # BLD AUTO: 0 10E3/UL
NRBC BLD AUTO-RTO: 0 /100
PH UR STRIP: 5.5 [PH] (ref 5–7)
PLATELET # BLD AUTO: 122 10E3/UL (ref 150–450)
POTASSIUM SERPL-SCNC: 2.7 MMOL/L (ref 3.4–5.3)
POTASSIUM SERPL-SCNC: 5.3 MMOL/L (ref 3.4–5.3)
PROT SERPL-MCNC: 7.4 G/DL (ref 6.4–8.3)
RBC # BLD AUTO: 4.46 10E6/UL (ref 3.8–5.2)
RBC URINE: 3 /HPF
RSV RNA SPEC NAA+PROBE: NEGATIVE
SARS-COV-2 RNA RESP QL NAA+PROBE: NEGATIVE
SODIUM SERPL-SCNC: 137 MMOL/L (ref 135–145)
SODIUM SERPL-SCNC: 143 MMOL/L (ref 135–145)
SP GR UR STRIP: 1.02 (ref 1–1.03)
SQUAMOUS EPITHELIAL: 1 /HPF
TROPONIN T SERPL HS-MCNC: <6 NG/L
UROBILINOGEN UR STRIP-MCNC: NORMAL MG/DL
WBC # BLD AUTO: 16 10E3/UL (ref 4–11)
WBC URINE: 2 /HPF

## 2024-12-07 PROCEDURE — 87637 SARSCOV2&INF A&B&RSV AMP PRB: CPT | Performed by: EMERGENCY MEDICINE

## 2024-12-07 PROCEDURE — 83735 ASSAY OF MAGNESIUM: CPT | Performed by: INTERNAL MEDICINE

## 2024-12-07 PROCEDURE — 258N000003 HC RX IP 258 OP 636: Performed by: EMERGENCY MEDICINE

## 2024-12-07 PROCEDURE — 82962 GLUCOSE BLOOD TEST: CPT

## 2024-12-07 PROCEDURE — 81001 URINALYSIS AUTO W/SCOPE: CPT | Performed by: INTERNAL MEDICINE

## 2024-12-07 PROCEDURE — 84484 ASSAY OF TROPONIN QUANT: CPT | Performed by: EMERGENCY MEDICINE

## 2024-12-07 PROCEDURE — 82310 ASSAY OF CALCIUM: CPT | Performed by: INTERNAL MEDICINE

## 2024-12-07 PROCEDURE — 36415 COLL VENOUS BLD VENIPUNCTURE: CPT | Performed by: INTERNAL MEDICINE

## 2024-12-07 PROCEDURE — 93005 ELECTROCARDIOGRAM TRACING: CPT

## 2024-12-07 PROCEDURE — 82077 ASSAY SPEC XCP UR&BREATH IA: CPT | Performed by: EMERGENCY MEDICINE

## 2024-12-07 PROCEDURE — 250N000013 HC RX MED GY IP 250 OP 250 PS 637: Performed by: EMERGENCY MEDICINE

## 2024-12-07 PROCEDURE — 99238 HOSP IP/OBS DSCHRG MGMT 30/<: CPT | Performed by: INTERNAL MEDICINE

## 2024-12-07 PROCEDURE — 80053 COMPREHEN METABOLIC PANEL: CPT | Performed by: EMERGENCY MEDICINE

## 2024-12-07 PROCEDURE — 99222 1ST HOSP IP/OBS MODERATE 55: CPT | Performed by: INTERNAL MEDICINE

## 2024-12-07 PROCEDURE — 82374 ASSAY BLOOD CARBON DIOXIDE: CPT | Performed by: INTERNAL MEDICINE

## 2024-12-07 PROCEDURE — 85025 COMPLETE CBC W/AUTO DIFF WBC: CPT | Performed by: EMERGENCY MEDICINE

## 2024-12-07 PROCEDURE — 83690 ASSAY OF LIPASE: CPT | Performed by: EMERGENCY MEDICINE

## 2024-12-07 PROCEDURE — 36415 COLL VENOUS BLD VENIPUNCTURE: CPT | Performed by: EMERGENCY MEDICINE

## 2024-12-07 PROCEDURE — 87086 URINE CULTURE/COLONY COUNT: CPT | Performed by: INTERNAL MEDICINE

## 2024-12-07 PROCEDURE — 258N000001 HC RX 258: Performed by: EMERGENCY MEDICINE

## 2024-12-07 PROCEDURE — 85048 AUTOMATED LEUKOCYTE COUNT: CPT | Performed by: EMERGENCY MEDICINE

## 2024-12-07 PROCEDURE — 96361 HYDRATE IV INFUSION ADD-ON: CPT

## 2024-12-07 PROCEDURE — 99285 EMERGENCY DEPT VISIT HI MDM: CPT | Mod: 25

## 2024-12-07 PROCEDURE — 96374 THER/PROPH/DIAG INJ IV PUSH: CPT

## 2024-12-07 PROCEDURE — G0378 HOSPITAL OBSERVATION PER HR: HCPCS

## 2024-12-07 RX ORDER — POTASSIUM CHLORIDE 1.5 G/1.58G
40 POWDER, FOR SOLUTION ORAL ONCE
Status: COMPLETED | OUTPATIENT
Start: 2024-12-07 | End: 2024-12-07

## 2024-12-07 RX ORDER — AMOXICILLIN 250 MG
1 CAPSULE ORAL 2 TIMES DAILY PRN
Status: DISCONTINUED | OUTPATIENT
Start: 2024-12-07 | End: 2024-12-07 | Stop reason: HOSPADM

## 2024-12-07 RX ORDER — AMOXICILLIN 250 MG
2 CAPSULE ORAL 2 TIMES DAILY PRN
Status: DISCONTINUED | OUTPATIENT
Start: 2024-12-07 | End: 2024-12-07 | Stop reason: HOSPADM

## 2024-12-07 RX ORDER — DEXTROSE MONOHYDRATE 50 MG/ML
INJECTION, SOLUTION INTRAVENOUS CONTINUOUS
Status: DISCONTINUED | OUTPATIENT
Start: 2024-12-07 | End: 2024-12-07

## 2024-12-07 RX ORDER — ACETAMINOPHEN 325 MG/1
650 TABLET ORAL EVERY 4 HOURS PRN
Status: DISCONTINUED | OUTPATIENT
Start: 2024-12-07 | End: 2024-12-07 | Stop reason: HOSPADM

## 2024-12-07 RX ORDER — PROCHLORPERAZINE MALEATE 10 MG
10 TABLET ORAL EVERY 6 HOURS PRN
Status: DISCONTINUED | OUTPATIENT
Start: 2024-12-07 | End: 2024-12-07 | Stop reason: HOSPADM

## 2024-12-07 RX ORDER — ONDANSETRON 4 MG/1
4 TABLET, ORALLY DISINTEGRATING ORAL EVERY 6 HOURS PRN
Status: DISCONTINUED | OUTPATIENT
Start: 2024-12-07 | End: 2024-12-07 | Stop reason: HOSPADM

## 2024-12-07 RX ORDER — POLYETHYLENE GLYCOL 3350 17 G/17G
17 POWDER, FOR SOLUTION ORAL DAILY
Status: DISCONTINUED | OUTPATIENT
Start: 2024-12-07 | End: 2024-12-07 | Stop reason: HOSPADM

## 2024-12-07 RX ORDER — DEXTROSE MONOHYDRATE 25 G/50ML
50 INJECTION, SOLUTION INTRAVENOUS ONCE
Status: COMPLETED | OUTPATIENT
Start: 2024-12-07 | End: 2024-12-07

## 2024-12-07 RX ORDER — DEXTROSE MONOHYDRATE 25 G/50ML
INJECTION, SOLUTION INTRAVENOUS
Status: COMPLETED
Start: 2024-12-07 | End: 2024-12-07

## 2024-12-07 RX ORDER — INSULIN ASPART 100 [IU]/ML
4 INJECTION, SOLUTION INTRAVENOUS; SUBCUTANEOUS
COMMUNITY

## 2024-12-07 RX ORDER — ACETAMINOPHEN 650 MG/1
650 SUPPOSITORY RECTAL EVERY 4 HOURS PRN
Status: DISCONTINUED | OUTPATIENT
Start: 2024-12-07 | End: 2024-12-07 | Stop reason: HOSPADM

## 2024-12-07 RX ORDER — ONDANSETRON 2 MG/ML
4 INJECTION INTRAMUSCULAR; INTRAVENOUS EVERY 6 HOURS PRN
Status: DISCONTINUED | OUTPATIENT
Start: 2024-12-07 | End: 2024-12-07 | Stop reason: HOSPADM

## 2024-12-07 RX ADMIN — DEXTROSE MONOHYDRATE 50 ML: 25 INJECTION, SOLUTION INTRAVENOUS at 03:49

## 2024-12-07 RX ADMIN — POTASSIUM CHLORIDE 40 MEQ: 1.5 POWDER, FOR SOLUTION ORAL at 04:57

## 2024-12-07 RX ADMIN — DEXTROSE MONOHYDRATE: 50 INJECTION, SOLUTION INTRAVENOUS at 04:58

## 2024-12-07 RX ADMIN — DEXTROSE MONOHYDRATE 50 ML: 25 INJECTION, SOLUTION INTRAVENOUS at 03:51

## 2024-12-07 RX ADMIN — SODIUM CHLORIDE 1000 ML: 9 INJECTION, SOLUTION INTRAVENOUS at 03:51

## 2024-12-07 ASSESSMENT — COLUMBIA-SUICIDE SEVERITY RATING SCALE - C-SSRS
2. HAVE YOU ACTUALLY HAD ANY THOUGHTS OF KILLING YOURSELF IN THE PAST MONTH?: NO
1. IN THE PAST MONTH, HAVE YOU WISHED YOU WERE DEAD OR WISHED YOU COULD GO TO SLEEP AND NOT WAKE UP?: NO
6. HAVE YOU EVER DONE ANYTHING, STARTED TO DO ANYTHING, OR PREPARED TO DO ANYTHING TO END YOUR LIFE?: NO

## 2024-12-07 ASSESSMENT — ACTIVITIES OF DAILY LIVING (ADL)
ADLS_ACUITY_SCORE: 42
ADLS_ACUITY_SCORE: 41
ADLS_ACUITY_SCORE: 41
ADLS_ACUITY_SCORE: 42
ADLS_ACUITY_SCORE: 41
ADLS_ACUITY_SCORE: 41
ADLS_ACUITY_SCORE: 42
ADLS_ACUITY_SCORE: 41

## 2024-12-07 NOTE — ED PROVIDER NOTES
Emergency Department Note      History of Present Illness     Chief Complaint   Hypoglycemia      HPI   Keyla Piña is a 49 year old female with history of uncontrolled diabetes mellitus, DKA, and lactic acidosis who presents to the ED with her daughter for evaluation of hypoglycemia. Patient's daughter reports Keyla had 2 episodes of hypoglycemia where she was profusely diaphoretic and altered. She woke her daughter up when this happened with about 1 hour in between the 2 episodes. Blood sugar was in the 20s to 30s at home. Patient does insulin injections, no recent changes. Keyla was complaining of dizziness. She was eating normally yesterday evening. Denies vomiting, diarrhea, abdominal pain, chest pain, or alcohol use.      Independent Historian   Daughter as detailed above.      Past Medical History     Medical History and Problem List   Gestational diabetes mellitus  DKA  Lactic acidosis  Type 1 diabetes mellitus, uncontrolled   Nuclear sclerosis, bilateral  Proliferative retinopathy with retinal edema  Type 2 diabetes mellitus   Symptomatic cholelithiasis  Vitreous hemorrhage     Medications   Insulin aspart   Insulin glargine   Senna     Surgical History   Retinal surgery, bilateral x3  Intravitreal injection, many  Vitrectomy x2  Cataract removal, bilateral  YAG capsulotomy, bilateral     Physical Exam     Patient Vitals for the past 24 hrs:   BP Temp Temp src Pulse Resp SpO2   12/07/24 0350 126/63 -- -- -- -- 94 %   12/07/24 0342 -- 97  F (36.1  C) Temporal 71 20 93 %     Physical Exam  General: Patient is awake, alert  Head: The scalp, face, and head appear normal  Eyes: The pupils are equal, round, and reactive to light. Conjunctivae and sclerae are normal  ENT: External acoustic canals are normal. The oropharynx is normal without erythema. Uvula is in the midline  Neck: Normal range of motion.   CV: Regular rate and rhythm.   Resp: Lungs are clear without wheezes or rales. No respiratory  distress.   GI: Abdomen is soft, no rigidity, guarding, or rebound. No distension. No tenderness to palpation in any quadrant.    MS: Normal tone. Joints grossly normal without effusions. No asymmetric leg swelling, calf or thigh tenderness.    Skin: No rash or lesions noted. Normal capillary refill noted  Neuro: Speech is normal and fluent. Face is symmetric. Moving all extremities.   Psych:  Normal affect.  Appropriate interactions.       Diagnostics     Lab Results   Labs Ordered and Resulted from Time of ED Arrival to Time of ED Departure   COMPREHENSIVE METABOLIC PANEL - Abnormal       Result Value    Sodium 143      Potassium 2.7 (*)     Carbon Dioxide (CO2) 21 (*)     Anion Gap 15      Urea Nitrogen 18.2      Creatinine 0.72      GFR Estimate >90      Calcium 9.0      Chloride 107      Glucose 37 (*)     Alkaline Phosphatase 103      AST 27      ALT 21      Protein Total 7.4      Albumin 4.0      Bilirubin Total <0.2     CBC WITH PLATELETS AND DIFFERENTIAL - Abnormal    WBC Count 16.0 (*)     RBC Count 4.46      Hemoglobin 13.4      Hematocrit 41.1      MCV 92      MCH 30.0      MCHC 32.6      RDW 13.7      Platelet Count 122 (*)     % Neutrophils 66      % Lymphocytes 24      % Monocytes 9      % Eosinophils 1      % Basophils 0      % Immature Granulocytes 0      NRBCs per 100 WBC 0      Absolute Neutrophils 10.5 (*)     Absolute Lymphocytes 3.8      Absolute Monocytes 1.4 (*)     Absolute Eosinophils 0.2      Absolute Basophils 0.1      Absolute Immature Granulocytes 0.1      Absolute NRBCs 0.0     GLUCOSE BY METER - Abnormal    GLUCOSE BY METER POCT 311 (*)    GLUCOSE BY METER - Abnormal    GLUCOSE BY METER POCT 205 (*)    GLUCOSE BY METER - Abnormal    GLUCOSE BY METER POCT 189 (*)    GLUCOSE BY METER - Abnormal    GLUCOSE BY METER POCT 205 (*)    LIPASE - Normal    Lipase 39     TROPONIN T, HIGH SENSITIVITY - Normal    Troponin T, High Sensitivity <6     ETHYL ALCOHOL LEVEL - Normal    Alcohol ethyl  <0.01     INFLUENZA A/B, RSV AND SARS-COV2 PCR - Normal    Influenza A PCR Negative      Influenza B PCR Negative      RSV PCR Negative      SARS CoV2 PCR Negative     ROUTINE UA WITH MICROSCOPIC REFLEX TO CULTURE       Imaging   No orders to display       EKG   ECG taken at 0353, ECG read at 0355  Normal sinus rhythm  Minimal voltage criteria for LVH, may be normal variant (R in aVL)  Cannot rule out Anterior infarct, age undetermined  Abnormal ECG    Rate 73 bpm. CA interval 168 ms. QRS duration 96 ms. QT/QTc 410/451 ms. P-R-T axes 54 0 16.    Independent Interpretation   None    ED Course      Medications Administered   Medications   dextrose 5% infusion ( Intravenous $New Bag 12/7/24 0458)   sodium chloride 0.9% BOLUS 1,000 mL (1,000 mLs Intravenous $New Bag 12/7/24 0351)   dextrose 50 % injection 50 mL (50 mLs Intravenous $Given 12/7/24 0349)   dextrose 50 % injection 50 mL (50 mLs Intravenous $Given 12/7/24 0351)   potassium chloride (KLOR-CON) Packet 40 mEq (40 mEq Oral $Given 12/7/24 0457)       Procedures   Procedures     Discussion of Management   Admitting Hospitalist, Dr. Jhaveri    ED Course   ED Course as of 12/07/24 0558   Sat Dec 07, 2024   0340 I obtained history and examined the patient as noted above.    0441 Nurse present reports patient took extra lantus before bed. She is unsure how much she took, but it was more than normal and a prescribed dosing.    0548 I spoke with Dr. Jhaveri, Hospitalist, regarding the patient's history and presentation in the emergency department today. Accepting patient for admission.       Additional Documentation  None    Medical Decision Making / Diagnosis       MONIKA Piña is a 49 year old female with past medical history of type 1 diabetes who presents to the emergency department with altered mental status likely due to hypoglycemia.  Patient's daughter reports that patient had 2 episodes of significant profound hypoglycemia overnight one in the 20s and one  in the 30s.  This prompted their visit to the emergency department today.  Initial evaluation in triage her blood sugar was 30.  Patient was treated with D50 and was able to eat here in the emergency department.  Repeat blood sugar was 311.  However patient precipitously dropped to 205 then 189.  Patient was started on D5 drip at 100/h.  This led to the stabilization of her blood sugars.  After the patient was treated and she was able to give us a better history and reported that she may have took some extra Lantus last night before bed.  She is unsure how much she took but it was more than her normal prescribing dose.  Given his history will admit for observation to ensure that we can stabilize her blood glucose.  No signs of significant infection or infarction on exam or work up.     Disposition   The patient was admitted to the hospital.     Diagnosis     ICD-10-CM    1. Hypoglycemia  E16.2          Scribe Disclosure:  Michelle SHULTZ, am serving as a scribe at 3:46 AM on 12/7/2024 to document services personally performed by Servando Chao MD based on my observations and the provider's statements to me.        Servando Chao MD  12/07/24 0700

## 2024-12-07 NOTE — PHARMACY-ADMISSION MEDICATION HISTORY
"Pharmacist Admission Medication History    Admission medication history is complete. The information provided in this note is only as accurate as the sources available at the time of the update.    Information Source(s): Patient and CareEverywhere/SureScripts via in-person    Pertinent Information:     Pt denied taking any oral medication/tablets. Reports she only uses the two insulin pens. Stating the short acting is an \"orange pen\", she at first said Fiasp but I am not sure where she would get this from. Both Fiasp and Novolog are orangish, so went with Novolog as that is what is on her PTA med list and so likely has some at home.     Pt denied using any sliding scale, states she only uses 4 units with meals no more, no less.     Changes made to PTA medication list:  Added: None  Deleted: None  Changed: Lantus sig, Novolog sig    Medication History Completed By:   Kelsie Hernandez, PharmD, [facility]   Emergency Medicine Pharmacist  [phone] or Sae  December 7, 2024    PTA Med List   Medication Sig Last Dose/Taking    insulin aspart (NOVOLOG FLEXPEN) 100 UNIT/ML pen Inject 4 Units subcutaneously 3 times daily (with meals). 12/6/2024    insulin glargine (LANTUS SOLOSTAR) 100 UNIT/ML PEN 25 units at bedtime (Patient taking differently: Inject 20 Units subcutaneously at bedtime.) 12/6/2024 Evening     "

## 2024-12-07 NOTE — ED NOTES
[facility]  ED Nurse Handoff Report    ED Chief complaint: Hypoglycemia  . ED Diagnosis:   Final diagnoses:   Hypoglycemia       Allergies:   Allergies   Allergen Reactions    Asa [Aspirin]     Ibuprofen        Code Status: Full Code    Activity level - Baseline/Home:  independent.  Activity Level - Current:   assist of 1.   Lift room needed: No.   Bariatric: No   Needed: Yes   Isolation: No.   Infection: Not Applicable.     Respiratory status: Room air    Vital Signs (within 30 minutes):   Vitals:    12/07/24 0342 12/07/24 0350   BP:  126/63   Pulse: 71    Resp: 20    Temp: 97  F (36.1  C)    TempSrc: Temporal    SpO2: 93% 94%       Cardiac Rhythm:  ,      Pain level:    Patient confused: No.   Patient Falls Risk: nonskid shoes/slippers when out of bed, arm band in place, and patient and family education.   Elimination Status: Has voided     Patient Report - Initial Complaint: hypoglycemia.   Focused Assessment: hypoglycemia     Abnormal Results:   Labs Ordered and Resulted from Time of ED Arrival to Time of ED Departure   COMPREHENSIVE METABOLIC PANEL - Abnormal       Result Value    Sodium 143      Potassium 2.7 (*)     Carbon Dioxide (CO2) 21 (*)     Anion Gap 15      Urea Nitrogen 18.2      Creatinine 0.72      GFR Estimate >90      Calcium 9.0      Chloride 107      Glucose 37 (*)     Alkaline Phosphatase 103      AST 27      ALT 21      Protein Total 7.4      Albumin 4.0      Bilirubin Total <0.2     CBC WITH PLATELETS AND DIFFERENTIAL - Abnormal    WBC Count 16.0 (*)     RBC Count 4.46      Hemoglobin 13.4      Hematocrit 41.1      MCV 92      MCH 30.0      MCHC 32.6      RDW 13.7      Platelet Count 122 (*)     % Neutrophils 66      % Lymphocytes 24      % Monocytes 9      % Eosinophils 1      % Basophils 0      % Immature Granulocytes 0      NRBCs per 100 WBC 0      Absolute Neutrophils 10.5 (*)     Absolute Lymphocytes 3.8      Absolute Monocytes 1.4 (*)     Absolute  Eosinophils 0.2      Absolute Basophils 0.1      Absolute Immature Granulocytes 0.1      Absolute NRBCs 0.0     GLUCOSE BY METER - Abnormal    GLUCOSE BY METER POCT 311 (*)    GLUCOSE BY METER - Abnormal    GLUCOSE BY METER POCT 205 (*)    GLUCOSE BY METER - Abnormal    GLUCOSE BY METER POCT 189 (*)    GLUCOSE BY METER - Abnormal    GLUCOSE BY METER POCT 205 (*)    LIPASE - Normal    Lipase 39     TROPONIN T, HIGH SENSITIVITY - Normal    Troponin T, High Sensitivity <6     ETHYL ALCOHOL LEVEL - Normal    Alcohol ethyl <0.01     INFLUENZA A/B, RSV AND SARS-COV2 PCR - Normal    Influenza A PCR Negative      Influenza B PCR Negative      RSV PCR Negative      SARS CoV2 PCR Negative     ROUTINE UA WITH MICROSCOPIC REFLEX TO CULTURE        No orders to display       Treatments provided: dextrose, ivf  Family Comments:   OBS brochure/video discussed/provided to patient:  Yes  ED Medications:   Medications   dextrose 5% infusion ( Intravenous $New Bag 12/7/24 0458)   sodium chloride 0.9% BOLUS 1,000 mL (1,000 mLs Intravenous $New Bag 12/7/24 0351)   dextrose 50 % injection 50 mL (50 mLs Intravenous $Given 12/7/24 4529)   dextrose 50 % injection 50 mL (50 mLs Intravenous $Given 12/7/24 0351)   potassium chloride (KLOR-CON) Packet 40 mEq (40 mEq Oral $Given 12/7/24 0457)       Drips infusing:  Yes  For the majority of the shift this patient was Green.   Interventions performed were .    Sepsis treatment initiated: No    Cares/treatment/interventions/medications to be completed following ED care:     ED Nurse Name: Kenyatta Tovar RN  5:58 AM

## 2024-12-07 NOTE — DISCHARGE SUMMARY
[facility] Discharge Summary    Keyla Piña MRN# [id number]   Age: 49 year old YOB: 1975     Date of Admission:  12/7/2024  Date of Discharge::  12/7/2024 11:40 AM  Admitting Physician:  Rosa Jhaveri MD  Discharge Physician:  Vladimir Miller MD     Home clinic: [facility] in [facility].           Admission Diagnoses:   Hypoglycemia          Discharge Diagnosis:     Principal Problem:    Hypoglycemia           Procedures:   Dextrose infusion.            Discharge Medications:     Discharge Medication List as of 12/7/2024 11:28 AM        CONTINUE these medications which have NOT CHANGED    Details   insulin aspart (NOVOLOG FLEXPEN) 100 UNIT/ML pen Inject 4 Units subcutaneously 3 times daily (with meals)., Historical      insulin aspart (NOVOLOG VIAL) 100 UNITS/ML soln 5 units before breakfast, 5 units before lunch, 5 units before dinner PLUS Sliding scale, Disp-3 Month, R-3, E-Prescribe      insulin glargine (LANTUS SOLOSTAR) 100 UNIT/ML PEN 25 units at bedtime, Disp-1 Month, R-1, E-Prescribe                   Consultations:   No consultations were requested during this admission          Brief History of Illness:   Keyla Piña is a 49 year old female admitted on 12/7/2024 with hypoglycemia. She had been brought to attention by her family who noticed that she was confused.  They checked her blood glucose at home and it was 20.  Routine attempts at management had failed.  Apparently the patient thinks that she simply took too much insulin though was unable to report how much she had administered.     In the emergency department, Ms. Piña was noted to be hemodynamically stable.  She also apparently quickly became appropriate after correction of her glucose.  Labs:  0.72 calcium 2.7, CO2 21, magnesium 1.5 with other electrolytes normal.  LFTs normal.  Initial glucose in triage 37.  WBC 16.0, Hgb 13.4, .  COVID, influenza A/B and RSV PCR is negative.    When I met  her, the pt admitted that she had taken her insulin with a pen, but that she had misplaced her glasses, so she guessed at the dose she administered. We talked about how important it was that she be very careful to not give herself to much insulin. It is better to be high than low in the short term due to the risk of coma and even death from hypoglycemia.           Hospital Course:   Ms. Piña was given boluses of dextrose in the ED, but subsequently required dextrose infusion for several hours. She was well enough to eat and by about 9 am, the infusion was stopped and the pt was monitored. At about lunchtime, her glucoses were well above 200 and she was considered safe for discharge. I confirmed with her that she has a functioning glucometer and short-acting as well as Long-acting Insulin available to her.     /69   Pulse 77   Temp 97.8  F (36.6  C) (Oral)   Resp 18   SpO2 98%   At the time of discharge, Ms. Piña is alert, coherent and in NAD.   HEENT: no facial muscular asymmetry  Chest: CTA, no increased work of breathing  COR: RRR without murmur          Discharge Instructions and Follow-Up:     Discharge diet: Regular   Discharge activity: Activity as tolerated   Discharge follow-up: Follow up with primary care provider as needed.           Discharge Disposition:     Discharged to home      Attestation:  I have reviewed today's vital signs, notes, medications, labs and imaging.    Vladimir Miller MD

## 2024-12-07 NOTE — PLAN OF CARE
"Provided cares for pt from [phone]. Pt a/o x4. Denied pain, and nausea.Tolerated regular diet. Blood sugars 251, 263, 279, 323. Pt independent to bathroom. Voided. IV removed. Went over discharge instructions with pt. Pt verbalized understanding. All questions answered. Belongings sent home with pt. Pt discharged home with .     /69   Pulse 77   Temp 97.8  F (36.6  C) (Oral)   Resp 18   SpO2 98%         Plan of Care Reviewed With: patient    Overall Patient Progress: improvingOverall Patient Progress: improving    Outcome Evaluation: Pt a/o x4. Denied pain. Tolerated regular diet. Blood sugars 251, 263, 279, 323. Pt independent to bathroom. Voided. IV removed. Went over discharge instructions with pt. Pt verbalized understanding. All questions answered. Belongings sent home with pt. Pt discharged home with .      Problem: Adult Inpatient Plan of Care  Goal: Plan of Care Review  Description: The Plan of Care Review/Shift note should be completed every shift.  The Outcome Evaluation is a brief statement about your assessment that the patient is improving, declining, or no change.  This information will be displayed automatically on your shift  note.  Outcome: Met  Flowsheets (Taken 12/7/2024 1135)  Outcome Evaluation: Pt a/o x4. Denied pain. Tolerated regular diet. Blood sugars 251, 263, 279, 323. Pt independent to bathroom. Voided. IV removed. Went over discharge instructions with pt. Pt verbalized understanding. All questions answered. Belongings sent home with pt. Pt discharged home with .  Plan of Care Reviewed With: patient  Overall Patient Progress: improving  Goal: Patient-Specific Goal (Individualized)  Description: You can add care plan individualizations to a care plan. Examples of Individualization might be:  \"Parent requests to be called daily at 9am for status\", \"I have a hard time hearing out of my right ear\", or \"Do not touch me to wake me up as it " "startles  me\".  Outcome: Met  Goal: Absence of Hospital-Acquired Illness or Injury  Outcome: Met  Intervention: Identify and Manage Fall Risk  Recent Flowsheet Documentation  Taken 12/7/2024 0750 by Ewelina Aguilera RN  Safety Promotion/Fall Prevention:   activity supervised   clutter free environment maintained   increased rounding and observation   nonskid shoes/slippers when out of bed   room door open   room near nurse's station   room organization consistent   safety round/check completed   supervised activity  Intervention: Prevent Skin Injury  Recent Flowsheet Documentation  Taken 12/7/2024 0750 by Ewelina Aguilera RN  Body Position:   position changed independently   sitting up in bed  Intervention: Prevent and Manage VTE (Venous Thromboembolism) Risk  Recent Flowsheet Documentation  Taken 12/7/2024 0750 by Ewelina Aguilera RN  VTE Prevention/Management: SCDs off (sequential compression devices)  Intervention: Prevent Infection  Recent Flowsheet Documentation  Taken 12/7/2024 0750 by Ewelina Aguilera RN  Infection Prevention:   equipment surfaces disinfected   hand hygiene promoted   personal protective equipment utilized   rest/sleep promoted   single patient room provided  Goal: Optimal Comfort and Wellbeing  Outcome: Met  Goal: Readiness for Transition of Care  Outcome: Met       "

## 2024-12-07 NOTE — H&P
History and Physical     Keyla Piña MRN# [id number]   YOB: 1975 Age: 49 year old      Date of Admission:  12/7/2024    Primary care provider: [facility], [facility]          Assessment and Plan:     Summary of Stay: Keyla Piña is a Georgian speaking with fair english speaking capability 49 year old female with a history of  htn/T2dm admitted on 12/7/2024 with hypoglycemia     She reports that she only uses glargine at night for her diabetes.  She checks her BG every morning but never throughout the day and it usually runs in the low 100's, rarely it can get up to 200.  The most recent hgb A1c that I see is from 11/2 and it was at 7% so control seems pretty good. She believes she was on about 25 unit(s) at bedtime which was decreased to 20 unit(s) about a month ago.     Last night her dtr found her confused and checked her BG and it was 20.  They gave her a coke and then she was fine.  About 30 mins later they checked on her again and she was again confused and complained of feeling dizzy and was diaphorhetic. They checked her BG again and it was 74.  They brought her in anyway and in triage her BG was 37.  She was given 2 amps of D 50 with improvement and then ate a meal.  BGs jumped into the 300 range but then rapidly started to fall again down to the 180's so started on D 5 drip at 100 ml per hour and hanging in the 200's.  She is currently feeling fine and hoping to discharge ASAP.     She's pretty confident that she took too much insulin last night but is not sure how much she took.  She uses a pen and does not draw up from a vial.     ER VSS    BMP notable for K 2.7, bicarb 21 AG 15  CBC with leukocytosis 16 with neutrophilia and monocytosis   Covid/flu/rsv negative  Etoh undetectable     She denies nc/ea/st, no cough/sob, no cp/palpitations, no n/v, no difficulty with urination     I am asked to admit to obs for hypoglycemia       Problem List:   Hypoglycemia   T2dm   Reports  taking too much glargine last night but isn't sure how much she took.  This would clearly fit with her presentation but not clear to me why she took extra and I'm not sure she knows either.  No renal dysfunction so expect insulin to clear reasonably easily   -decrease D 5 to 50 ml/hour, ck BG q 1 hour and when > 100 x 3 in a row notify MD when can probably stop D 5 and monitor off  -would be nice if she could go home with a CGM-if only for a 3 day stretch to see what her blood sugars do   -hgb A1c was 7 % 11/2 so has reasonable control.  She tells me that her insulin was just decreased to 20 unit(s) about a month ago so would probably be based on this level.  IF unable to get CGM would rec increasing frequency of BG cks at home as currently only doing it every morning  -should get RN education about insulin administration     Hypokalemia   K is pretty low at 2.7, she was given 40 meq of KCL in the ER  Ck mag  Start K and mag protocol  Await med rec, I wonder if she's on  a potassium wasting antihypertensive     htn  Await med rec, she is not sure of what she takes at home         DVT Prophylaxis: Low Risk/Ambulatory with no VTE prophylaxis indicated  Code Status: Full Code  Functional Status: independent  Nova: not needed  Access:   PIV        Offered the use of a phone  but family denied it       Time spent 65 minutes reviewing epic including notes/labs/prior hx, current medications.  In addition to interviewing and examining the patient, updated patient and family regarding plan of care          Chief Complaint:     Hypoglycemia        History of Present Illness:   Keyla Piña is a Swazi speaking with fair english speaking capability 49 year old female with a history of  htn/T2dm admitted on 12/7/2024 with hypoglycemia     She reports that she only uses glargine at night for her diabetes.  She checks her BG every morning but never throughout the day and it usually runs in the low 100's, rarely  it can get up to 200.  The most recent hgb A1c that I see is from 11/2 and it was at 7% so control seems pretty good. She believes she was on about 25 unit(s) at bedtime which was decreased to 20 unit(s) about a month ago.     Last night her dtr found her confused and checked her BG and it was 20.  They gave her a coke and then she was fine.  About 30 mins later they checked on her again and she was again confused and complained of feeling dizzy and was diaphorhetic. They checked her BG again and it was 74.  They brought her in anyway and in triage her BG was 37.  She was given 2 amps of D 50 with improvement and then ate a meal.  BGs jumped into the 300 range but then rapidly started to fall again down to the 180's so started on D 5 drip at 100 ml per hour and hanging in the 200's.  She is currently feeling fine and hoping to discharge ASAP.     She's pretty confident that she took too much insulin last night but is not sure how much she took.  She uses a pen and does not draw up from a vial.     ER VSS    BMP notable for K 2.7, bicarb 21 AG 15  CBC with leukocytosis 16 with neutrophilia and monocytosis   Covid/flu/rsv negative  Etoh undetectable     She denies nc/ea/st, no cough/sob, no cp/palpitations, no n/v, no difficulty with urination     I am asked to admit to obs for hypoglycemia     The history is obtained in discussion with the ER provider  Roberto Chao MD and the patient with family interpreting with fair reliability      Epic and Care everywhere were extensively reviewed        Past Medical History:     Past Medical History:   Diagnosis Date    Benign essential hypertension     GDM (gestational diabetes mellitus)     1995- sone, diet and exercise, 2008 daughter- insulin 6 months; not checked since then    T2DM (type 2 diabetes mellitus) (H)              Past Surgical History:     Past Surgical History:   Procedure Laterality Date    CHOLECYSTECTOMY               Social History:     Social History      Tobacco Use    Smoking status: Never    Smokeless tobacco: Never   Substance Use Topics    Alcohol use: No             Family History:   I have reviewed this patient's family history         Allergies:     Allergies   Allergen Reactions    Asa [Aspirin]     Ibuprofen              Medications:   Await med rec        Review of Systems:     A Comprehensive greater than 10 system review of systems was carried out.  Pertinent positives and negatives are noted above.  Otherwise negative for contributory information.           Physical Exam:   Blood pressure 126/63, pulse 71, temperature 97  F (36.1  C), temperature source Temporal, resp. rate 20, SpO2 94%.  Exam:    General:  Pleasant nad looks stated age  HEENT:  Head nc/at sclera clear PER Neck is supple  Lungs: cta b nl effort   CV:  RRR no m/r/g no le edema  Abd:  S/nt/nd no r/g  Neuro:  Cn 2-12 grossly intact and pavon   Alert and oriented affect appropriate   Skin:  W/d no c/c         Data:     Results for orders placed or performed during the hospital encounter of 12/07/24   Comprehensive metabolic panel     Status: Abnormal   Result Value Ref Range    Sodium 143 135 - 145 mmol/L    Potassium 2.7 (L) 3.4 - 5.3 mmol/L    Carbon Dioxide (CO2) 21 (L) 22 - 29 mmol/L    Anion Gap 15 7 - 15 mmol/L    Urea Nitrogen 18.2 6.0 - 20.0 mg/dL    Creatinine 0.72 0.51 - 0.95 mg/dL    GFR Estimate >90 >60 mL/min/1.73m2    Calcium 9.0 8.8 - 10.4 mg/dL    Chloride 107 98 - 107 mmol/L    Glucose 37 (LL) 70 - 99 mg/dL    Alkaline Phosphatase 103 40 - 150 U/L    AST 27 0 - 45 U/L    ALT 21 0 - 50 U/L    Protein Total 7.4 6.4 - 8.3 g/dL    Albumin 4.0 3.5 - 5.2 g/dL    Bilirubin Total <0.2 <=1.2 mg/dL   Lipase     Status: Normal   Result Value Ref Range    Lipase 39 13 - 60 U/L   Troponin T, High Sensitivity     Status: Normal   Result Value Ref Range    Troponin T, High Sensitivity <6 <=14 ng/L   Ethyl Alcohol Level     Status: Normal   Result Value Ref Range    Alcohol ethyl <0.01  <=0.01 g/dL   Influenza A/B, RSV and SARS-CoV2 PCR (COVID-19) Nose     Status: Normal    Specimen: Nose; Swab   Result Value Ref Range    Influenza A PCR Negative Negative    Influenza B PCR Negative Negative    RSV PCR Negative Negative    SARS CoV2 PCR Negative Negative    Narrative    Testing was performed using the Xpert Xpress CoV2/Flu/RSV Assay on the [employer] GeneXpert Instrument. This test should be ordered for the detection of SARS-CoV2, influenza, and RSV viruses in individuals with signs and symptoms of respiratory tract infection. This test is for in vitro diagnostic use under the [place] FDA for laboratories certified under CLIA to perform high or moderate complexity testing. This test has been [place] FDA cleared. A negative result does not rule out the presence of PCR inhibitors in the specimen or target RNA in concentration below the limit of detection for the assay. If only one viral target is positive but coinfection with multiple targets is suspected, the sample should be re-tested with another FDA cleared, approved, or authorized test, if coninfection would change clinical management. This test was validated by the [facility]. These laboratories are certified under the Clinical Laboratory Improvement Amendments of 1988 (CLIA-88) as qualified to perfom high complexity laboratory testing.   Extra Tube ([facility] Draw)     Status: None    Narrative    The following orders were created for panel order Extra Tube ([place] Draw).  Procedure                               Abnormality         Status                     ---------                               -----------         ------                     Extra Blue Top Tube[[id number]]                              Final result               Extra Red Top Tube[[id number]]                               Final result                 Please view results for these tests on the individual orders.   CBC with platelets and differential     Status: Abnormal    Result Value Ref Range    WBC Count 16.0 (H) 4.0 - 11.0 10e3/uL    RBC Count 4.46 3.80 - 5.20 10e6/uL    Hemoglobin 13.4 11.7 - 15.7 g/dL    Hematocrit 41.1 35.0 - 47.0 %    MCV 92 78 - 100 fL    MCH 30.0 26.5 - 33.0 pg    MCHC 32.6 31.5 - 36.5 g/dL    RDW 13.7 10.0 - 15.0 %    Platelet Count 122 (L) 150 - 450 10e3/uL    % Neutrophils 66 %    % Lymphocytes 24 %    % Monocytes 9 %    % Eosinophils 1 %    % Basophils 0 %    % Immature Granulocytes 0 %    NRBCs per 100 WBC 0 <1 /100    Absolute Neutrophils 10.5 (H) 1.6 - 8.3 10e3/uL    Absolute Lymphocytes 3.8 0.8 - 5.3 10e3/uL    Absolute Monocytes 1.4 (H) 0.0 - 1.3 10e3/uL    Absolute Eosinophils 0.2 0.0 - 0.7 10e3/uL    Absolute Basophils 0.1 0.0 - 0.2 10e3/uL    Absolute Immature Granulocytes 0.1 <=0.4 10e3/uL    Absolute NRBCs 0.0 10e3/uL   Extra Blue Top Tube     Status: None   Result Value Ref Range    Hold Specimen JIC    Extra Red Top Tube     Status: None   Result Value Ref Range    Hold Specimen JIC    Glucose by meter     Status: Abnormal   Result Value Ref Range    GLUCOSE BY METER POCT 311 (H) 70 - 99 mg/dL   Glucose by meter     Status: Abnormal   Result Value Ref Range    GLUCOSE BY METER POCT 205 (H) 70 - 99 mg/dL   Glucose by meter     Status: Abnormal   Result Value Ref Range    GLUCOSE BY METER POCT 189 (H) 70 - 99 mg/dL   Glucose by meter     Status: Abnormal   Result Value Ref Range    GLUCOSE BY METER POCT 205 (H) 70 - 99 mg/dL   Glucose by meter     Status: Abnormal   Result Value Ref Range    GLUCOSE BY METER POCT 200 (H) 70 - 99 mg/dL   EKG 12-lead, tracing only     Status: None (Preliminary result)   Result Value Ref Range    Systolic Blood Pressure  mmHg    Diastolic Blood Pressure  mmHg    Ventricular Rate 73 BPM    Atrial Rate 73 BPM    IA Interval 168 ms    QRS Duration 96 ms     ms    QTc 451 ms    P Axis 54 degrees    R AXIS 0 degrees    T Axis 16 degrees    Interpretation ECG       Sinus rhythm  Minimal voltage criteria for  LVH, may be normal variant ( R in aVL )  Cannot rule out Anterior infarct , age undetermined  Abnormal ECG  When compared with ECG of 12-Oct-2014 02:16,  Vent. rate has decreased by  55 bpm     CBC with platelets differential     Status: Abnormal    Narrative    The following orders were created for panel order CBC with platelets differential.  Procedure                               Abnormality         Status                     ---------                               -----------         ------                     CBC with platelets and d...[[id number]]  Abnormal            Final result                 Please view results for these tests on the individual orders.

## 2024-12-09 LAB
ATRIAL RATE - MUSE: 73 BPM
BACTERIA UR CULT: NORMAL
DIASTOLIC BLOOD PRESSURE - MUSE: NORMAL MMHG
INTERPRETATION ECG - MUSE: NORMAL
P AXIS - MUSE: 54 DEGREES
PR INTERVAL - MUSE: 168 MS
QRS DURATION - MUSE: 96 MS
QT - MUSE: 410 MS
QTC - MUSE: 451 MS
R AXIS - MUSE: 0 DEGREES
SYSTOLIC BLOOD PRESSURE - MUSE: NORMAL MMHG
T AXIS - MUSE: 16 DEGREES
VENTRICULAR RATE- MUSE: 73 BPM